# Patient Record
Sex: FEMALE | Race: WHITE | Employment: UNEMPLOYED | URBAN - METROPOLITAN AREA
[De-identification: names, ages, dates, MRNs, and addresses within clinical notes are randomized per-mention and may not be internally consistent; named-entity substitution may affect disease eponyms.]

---
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
## 2025-08-12 ENCOUNTER — APPOINTMENT (OUTPATIENT)
Dept: URBAN - METROPOLITAN AREA CLINIC 252 | Age: 20
Setting detail: DERMATOLOGY
End: 2025-08-16

## 2025-08-12 VITALS — WEIGHT: 142 LBS | RESPIRATION RATE: 16 BRPM | HEIGHT: 64 IN

## 2025-08-12 DIAGNOSIS — D22 MELANOCYTIC NEVI: ICD-10-CM

## 2025-08-12 PROBLEM — D22.72 MELANOCYTIC NEVI OF LEFT LOWER LIMB, INCLUDING HIP: Status: ACTIVE | Noted: 2025-08-12

## 2025-08-12 PROBLEM — D22.9 MELANOCYTIC NEVI, UNSPECIFIED: Status: ACTIVE | Noted: 2025-08-12

## 2025-08-12 PROBLEM — D22.4 MELANOCYTIC NEVI OF SCALP AND NECK: Status: ACTIVE | Noted: 2025-08-12

## 2025-08-12 PROBLEM — D22.39 MELANOCYTIC NEVI OF OTHER PARTS OF FACE: Status: ACTIVE | Noted: 2025-08-12

## 2025-08-12 PROCEDURE — OTHER COUNSELING: OTHER

## 2025-08-12 PROCEDURE — OTHER ADDITIONAL NOTES: OTHER

## 2025-08-12 PROCEDURE — 99203 OFFICE O/P NEW LOW 30 MIN: CPT

## 2025-08-12 ASSESSMENT — LOCATION DETAILED DESCRIPTION DERM
LOCATION DETAILED: RIGHT INFERIOR POSTERIOR NECK
LOCATION DETAILED: RIGHT ARCH
LOCATION DETAILED: LEFT INFERIOR MEDIAL MALAR CHEEK
LOCATION DETAILED: LEFT MEDIAL 3RD TOE
LOCATION DETAILED: MEDIAL FRONTAL SCALP
LOCATION DETAILED: LEFT MEDIAL FRONTAL SCALP

## 2025-08-12 ASSESSMENT — LOCATION SIMPLE DESCRIPTION DERM
LOCATION SIMPLE: FRONTAL SCALP
LOCATION SIMPLE: POSTERIOR NECK
LOCATION SIMPLE: RIGHT PLANTAR SURFACE
LOCATION SIMPLE: LEFT 3RD TOE
LOCATION SIMPLE: LEFT SCALP
LOCATION SIMPLE: LEFT CHEEK

## 2025-08-12 ASSESSMENT — LOCATION ZONE DERM
LOCATION ZONE: NECK
LOCATION ZONE: SCALP
LOCATION ZONE: FEET
LOCATION ZONE: TOE
LOCATION ZONE: FACE

## 2025-08-15 ENCOUNTER — ANCILLARY PROCEDURE (OUTPATIENT)
Dept: GENERAL RADIOLOGY | Facility: CLINIC | Age: 20
End: 2025-08-15
Attending: PHYSICIAN ASSISTANT
Payer: COMMERCIAL

## 2025-08-15 DIAGNOSIS — M79.671 RIGHT FOOT PAIN: ICD-10-CM

## 2025-08-15 PROBLEM — J45.990 EXERCISE-INDUCED ASTHMA: Status: ACTIVE | Noted: 2025-08-15

## 2025-08-15 PROCEDURE — 73630 X-RAY EXAM OF FOOT: CPT | Mod: TC | Performed by: RADIOLOGY
